# Patient Record
(demographics unavailable — no encounter records)

---

## 2025-01-06 NOTE — PHYSICAL EXAM
[NL] : no acute distress, alert [Tenderness With Palpation of Chest Wall] : tenderness with palpation of chest wall [de-identified] : reproducible chest pain to right chest wall

## 2025-01-06 NOTE — RISK ASSESSMENT
[Eats meals with family] : eats meals with family [Has family members/adults to turn to for help] : has family members/adults to turn to for help [Is permitted and is able to make independent decisions] : Is permitted and is able to make independent decisions [Eats regular meals including adequate fruits and vegetables] : eats regular meals including adequate fruits and vegetables [Calcium source] : calcium source [Has friends] : has friends [Home is free of violence] : home is free of violence [Uses safety belts/safety equipment] : uses safety belts/safety equipment  [Has peer relationships free of violence] : has peer relationships free of violence [Has ways to cope with stress] : has ways to cope with stress [Displays self-confidence] : displays self-confidence [With Teen] : teen [Has concerns about body or appearance] : does not have concerns about body or appearance [At least 1 hour of physical activity a day] : does not do at least 1 hour of physical activity a day [Screen time (except homework) less than 2 hours a day] : no screen time (except homework) less than 2 hours a day [Has interests/participates in community activities/volunteers] : does not have interests/participates in community activities/volunteers [Uses tobacco] : does not use tobacco [Uses drugs] : does not use drugs  [Drinks alcohol] : does not drink alcohol [Impaired/distracted driving] : no impaired/distracted driving [Has/had oral sex] : has not had oral sex [Has had sexual intercourse] : has not had sexual intercourse [Has problems with sleep] : does not have problems with sleep [Gets depressed, anxious, or irritable/has mood swings] : does not get depressed, anxious, or irritable/has mood swings [Has thought about hurting self or considered suicide] : has not thought about hurting self or considered suicide [de-identified] : lives with mom, stepdad, grandmother and siblings [de-identified] : 10th grade [employer] failing math, ODIN, biology [de-identified] : [place] smokes cigarettes but not in house [de-identified] : virgin, interested in females only but no current girlfriend [de-identified] : ulysses with stress by sleeping,

## 2025-01-06 NOTE — HISTORY OF PRESENT ILLNESS
[FreeTextEntry6] : 15 year old male presents to clinic with c/o right sided chest pain and headache  chest pain started last night - student made mom aware this morning mom wants to take to ER after school for evaluation no medication for pain this morning or last night c/o pinching pain to right chest wall.  pain is slightly worse with deep inspiration and constant, has not changed intensity since last night denies SOB, cough or cold symptoms  c/o headache behind bilateral eyes 8/10 since 1st period

## 2025-01-06 NOTE — DISCUSSION/SUMMARY
[FreeTextEntry1] : 15 year old male presents to clinic with c/o chest pain and headache Ibuprofen 400mg tablet x 1 dispensed now may repeat dose every 6 hours as needed  call placed to mom for pickup. school called to notify of pickup. awaiting in clinic.  [facility] form reviewed - no MH issues/concerns identified

## 2025-01-08 NOTE — DISCUSSION/SUMMARY
[FreeTextEntry1] : 15 year old male presents for headache Ibuprofen 400 mg 1 tab PO dispensed. may repeat dose every 6 hours as needed Counseled re: supportive care and pain management. Encouraged rest.  Reinforced healthy sleep habits. Regular meals and adequate hydration. Encouraged regular exercise, stress management, and avoiding triggers. Return to clinic as needed for new or worsening symptoms. no further chest pain after Ibuprofen on 1/6/25

## 2025-01-08 NOTE — HISTORY OF PRESENT ILLNESS
[FreeTextEntry6] : 15 year old male presents with frontal and temporal headache since this morning fever or URI symptoms? denies n/v, dizziness, photophobia? denies Description of pain: frontal/temporal throbbing 8/10  Last ate: lunch Headache history? does not get a lot of headaches What are your stressors? Denies excess stress